# Patient Record
Sex: MALE | Race: WHITE | NOT HISPANIC OR LATINO | Employment: STUDENT | URBAN - METROPOLITAN AREA
[De-identification: names, ages, dates, MRNs, and addresses within clinical notes are randomized per-mention and may not be internally consistent; named-entity substitution may affect disease eponyms.]

---
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
## 2024-03-28 ENCOUNTER — TELEMEDICINE (OUTPATIENT)
Dept: PEDIATRICS | Facility: CLINIC | Age: 19
End: 2024-03-28
Payer: COMMERCIAL

## 2024-03-28 DIAGNOSIS — F19.939: Primary | ICD-10-CM

## 2024-03-28 PROBLEM — F32.A DEPRESSION: Status: RESOLVED | Noted: 2023-02-16 | Resolved: 2024-03-28

## 2024-03-28 PROBLEM — R46.81 OBSESSIVE-COMPULSIVE BEHAVIOR: Status: RESOLVED | Noted: 2023-02-16 | Resolved: 2024-03-28

## 2024-03-28 PROBLEM — F41.9 ANXIETY: Status: RESOLVED | Noted: 2023-02-16 | Resolved: 2024-03-28

## 2024-03-28 PROCEDURE — 96127 BRIEF EMOTIONAL/BEHAV ASSMT: CPT | Performed by: PEDIATRICS

## 2024-03-28 PROCEDURE — 99213 OFFICE O/P EST LOW 20 MIN: CPT | Performed by: PEDIATRICS

## 2024-03-28 PROCEDURE — 1036F TOBACCO NON-USER: CPT | Performed by: PEDIATRICS

## 2024-03-28 NOTE — PROGRESS NOTES
Subjective   Patient ID: Jorge Norman is a 18 y.o. male, otherwise healthy, who presents virtually for Follow-up (Med check).  He presents alone virtually.    HPI  Jorge Norman is a 18 y.o. male presenting virtually for a medication follow up for OCD, anxiety, and depression . He was last on Clonidine 0.1 mg, 3 tablets per night; and Duloxetine 60 mg, 1 capsule per night. He weaned himself off of his medication. He has been off his medications for 3 weeks. He states he feels great.    The SUSY-7 is 3. This result was 3 on 9/30/23. The patient feel that these symptoms are not at all difficult.  The scoring scale is as follows: 0-5 is minimal anxiety, 6-10 is mild anxiety, 11-15 is moderate anxiety, and 16-21 is severe anxiety. A score greater than 7 is clinically significant.     The PHQ-9A is 4. This result was 3 on 9/30/23. The patient feel that these symptoms are not at all difficult.  The severity measure for depression age 11-17, PHQ-9 modified for adolescence scoring results are as follows: 0-4 = none, 5-9 = mild, 10-14 = moderate, 15-19 = moderately severe, and 20-27 = severe.     Review of Systems  The following history was obtained from patient.   Constitutional: Otherwise denies fever, chills, or changes in behavior. No difficulties with sleeping, eating, drinking, urine output, or bowel movements.    Eyes, ENT: Denies eye complaints, ear complaints, nasal congestion, runny nose, or sore throat.   Cardio/Resp: Denies chest pain, palpitations, shortness of breath, wheezing, stridor at rest, cough, working hard to breathe, or breathing fast.   GI/Renal: Denies nausea, vomiting, stomachache, diarrhea, or constipation. Denies dysuria or abnormal urine color or smell.   Musculoskeletal/Skin: Denies muscle or joint complaints. Denies skin rash.   Neuro/Psych: Positive OCD (resolved), anxiety (resolved), depression (resolved). Denies headache, dizziness, confusion, irritability, or fussiness.    Endo/heme/lymph: Denies excessive thirst, excessive sweating, bruising, bleeding, or swollen glands.     Objective   There were no vitals taken for this visit.  BSA: There is no height or weight on file to calculate BSA.  Growth percentiles: No height on file for this encounter. No weight on file for this encounter.     Physical Exam  Limited exam due to Telehealth visit.     Constitutional - Well developed, well nourished, well hydrated and no acute distress  Head and Face - Normal cephalic, atraumatic  Neck - supple, no visibly obvious goiter  Pulmonary - Normal work of breathing.   Cardiovascular - No cyanosis   Musculoskeletal - Normal range of motion  Skin - No significant rash or lesions  Neurologic - Normal  Psychiatric - Awake and alert. Normal mood and affect. Normal parent/child interaction      Problem List Items Addressed This Visit             ICD-10-CM       Mental Health    Drug withdrawal syndrome (CMS/HCC) - Primary F19.939     Time in: 3:38 pm  Time done: 3:52 pm    Assessment/Plan    Jorge presents virtually for a medication follow up for OCD, anxiety, and depression. He was last on Clonidine 0.1 mg, 3 tablets per night; and Duloxetine 60 mg, 1 capsule per night. He weaned himself off of his medication. He has been off his medications for 3 weeks. He states he feels great.    He is doing well.    Scribe Attestation  By signing my name below, I, Yvette Carey, attest that this documentation has been prepared under the direction and in the presence of Dr. Maggi Vasquez.    Provider Attestation - Scribe documentation  All medical record entries made by the Scribe were at my direction and personally dictated by me. I have reviewed the chart and agree that the record accurately reflects my personal performance of the history, physical exam, discussion and plan.

## 2024-03-28 NOTE — PATIENT INSTRUCTIONS
Jorge presents virtually for a medication follow up for OCD, anxiety, and depression. He was last on Clonidine 0.1 mg, 3 tablets per night; and Duloxetine 60 mg, 1 capsule per night. He weaned himself off of his medication. He has been off his medications for 3 weeks. He states he feels great.    He is doing well.

## 2025-09-05 ENCOUNTER — APPOINTMENT (OUTPATIENT)
Dept: PEDIATRICS | Facility: CLINIC | Age: 20
End: 2025-09-05
Payer: COMMERCIAL

## 2025-09-05 PROBLEM — R30.0 DYSURIA: Status: ACTIVE | Noted: 2025-09-05

## 2025-09-05 PROBLEM — Z00.00 WELL ADULT EXAM: Status: ACTIVE | Noted: 2025-09-05

## 2025-09-05 ASSESSMENT — PATIENT HEALTH QUESTIONNAIRE - PHQ9
SUM OF ALL RESPONSES TO PHQ QUESTIONS 1-9: 6
9. THOUGHTS THAT YOU WOULD BE BETTER OFF DEAD, OR OF HURTING YOURSELF: NOT AT ALL
2. FEELING DOWN, DEPRESSED OR HOPELESS: SEVERAL DAYS
1. LITTLE INTEREST OR PLEASURE IN DOING THINGS: SEVERAL DAYS
8. MOVING OR SPEAKING SO SLOWLY THAT OTHER PEOPLE COULD HAVE NOTICED. OR THE OPPOSITE, BEING SO FIGETY OR RESTLESS THAT YOU HAVE BEEN MOVING AROUND A LOT MORE THAN USUAL: NOT AT ALL
6. FEELING BAD ABOUT YOURSELF - OR THAT YOU ARE A FAILURE OR HAVE LET YOURSELF OR YOUR FAMILY DOWN: SEVERAL DAYS
SUM OF ALL RESPONSES TO PHQ9 QUESTIONS 1 AND 2: 2
7. TROUBLE CONCENTRATING ON THINGS, SUCH AS READING THE NEWSPAPER OR WATCHING TELEVISION: NOT AT ALL
5. POOR APPETITE OR OVEREATING: NOT AT ALL
3. TROUBLE FALLING OR STAYING ASLEEP OR SLEEPING TOO MUCH: MORE THAN HALF THE DAYS
4. FEELING TIRED OR HAVING LITTLE ENERGY: SEVERAL DAYS

## 2025-09-05 ASSESSMENT — ANXIETY QUESTIONNAIRES
GAD7 TOTAL SCORE: 7
5. BEING SO RESTLESS THAT IT IS HARD TO SIT STILL: SEVERAL DAYS
1. FEELING NERVOUS, ANXIOUS, OR ON EDGE: SEVERAL DAYS
4. TROUBLE RELAXING: NOT AT ALL
3. WORRYING TOO MUCH ABOUT DIFFERENT THINGS: SEVERAL DAYS
6. BECOMING EASILY ANNOYED OR IRRITABLE: MORE THAN HALF THE DAYS
2. NOT BEING ABLE TO STOP OR CONTROL WORRYING: SEVERAL DAYS
7. FEELING AFRAID AS IF SOMETHING AWFUL MIGHT HAPPEN: SEVERAL DAYS